# Patient Record
(demographics unavailable — no encounter records)

---

## 2024-10-29 NOTE — REASON FOR VISIT
[Follow Up] : a follow up visit [Patient] : patient [Parents] : parents [FreeTextEntry1] : Left cuboid fracture, sustained on 9/1/24

## 2024-10-29 NOTE — ASSESSMENT
[FreeTextEntry1] : Antelmo is a 5 yo F with a L foot cuboid fracture, healed appropriately.    The condition, natural history, and prognosis were explained to the family. Today's visit included obtaining the history from the child and parent, due to the child's age, the child could not be considered a reliable historian, requiring the parent to act as an independent historian. The clinical findings and images were reviewed with the family. XRs of the left foot performed and reviewed in office today, revealing a well healed left cuboid fracture. Clinically she is doing well with no tenderness over fracture site. At this point CAM walker can be fully discontinued and she can weight bear as tolerated in a regular sneaker. No gym, sports or playground activity at this time. After 3 weeks she can resume activities as tolerated. Follow up recommended in my office on an as needed basis. All questions and concerns were addressed today. Family verbalizes understanding and agree with plan of care.   I, Ludivina Mercado PA-C, have acted as a scribe and documented the above information for Dr. Jimenez.

## 2024-10-29 NOTE — DATA REVIEWED
[de-identified] : XR L foot 3 views obtained and independently reviewed in our office 10/25/24: well healing nondisplaced cuboid fracture. Interval callous formation seen.   XR L Foot 3 views (9/27/24): Nondisplaced cuboid fracture in appropriate alignment. Interval healing noted, callus formation noted.   XR L Ankle 3 Views (9/27/24): No obvious fracture or dislocations.

## 2024-10-29 NOTE — END OF VISIT
[FreeTextEntry3] :     Saw and examined patient; the above is an accurate documentation of my words and actions.   Jessica Jimenez MD [facility] Pediatric Orthopedic Surgery

## 2024-10-29 NOTE — HISTORY OF PRESENT ILLNESS
[FreeTextEntry1] : Antelmo is a 7 yo F who presents with L foot injury, sustained on 9/1/24. Patient was running and tripped in a hole, injuring the L foot. She had swelling and pain of the left foot. Patient presented to the ED for evaluation, where XRs were performed, showing possible fracture of the cuboid. Patient was placed into a splint. They were instructed to be no weight bearing of the left lower extremity, and no activities. She has since been transitioned to a CAM walker, allowed to start bearing weight in the CAM walker at last office visit on 9/27/24. She reports she has been doing well since that time with no recent complaints of pain or discomfort. She has start to wean out of CAM walker while at home. No reported left lower extremity numbness or tingling. She presents today for repeat XRs and clinical reassessment.   The patient's HPI was reviewed thoroughly with patient and parent. The patient's parent has acted as an independent historian regarding the above information due to the unreliable nature of the history obtained from the patient.

## 2024-10-29 NOTE — END OF VISIT
[FreeTextEntry3] :     Saw and examined patient; the above is an accurate documentation of my words and actions.   Jessica Jimenez MD [facility] Pediatric Orthopedic Surgery

## 2024-10-29 NOTE — DATA REVIEWED
[de-identified] : XR L foot 3 views obtained and independently reviewed in our office 10/25/24: well healing nondisplaced cuboid fracture. Interval callous formation seen.   XR L Foot 3 views (9/27/24): Nondisplaced cuboid fracture in appropriate alignment. Interval healing noted, callus formation noted.   XR L Ankle 3 Views (9/27/24): No obvious fracture or dislocations.

## 2024-10-29 NOTE — PHYSICAL EXAM
[FreeTextEntry1] : General: healthy appearing, acting appropriate for age.  HEENT: NCAT, Normal conjunctiva Cardio: Appears well perfused, no peripheral edema, brisk cap refill.  Lungs: no obvious increased WOB, no audible wheeze heard without use of stethoscope.  Abdomen: not examined.  Skin: No visible rashes on exposed skin  L foot:  No bony deformities, ecchymosis, or erythema noted over the foot/ankle. No ttp over the area of the cuboid No other tenderness of the foot or ankle.  Full and painless ankle plantar flexion and dorsiflexion  SILT Tib/SPN/DPN/Sural Saph + EHL/FHL/GS/TA Toes are warm, pink, and moving freely. Brisk capillary refill in all toes.